# Patient Record
Sex: MALE | Race: OTHER | HISPANIC OR LATINO
[De-identification: names, ages, dates, MRNs, and addresses within clinical notes are randomized per-mention and may not be internally consistent; named-entity substitution may affect disease eponyms.]

---

## 2022-06-27 ENCOUNTER — APPOINTMENT (OUTPATIENT)
Dept: ORTHOPEDIC SURGERY | Facility: CLINIC | Age: 57
End: 2022-06-27
Payer: COMMERCIAL

## 2022-06-27 VITALS — HEIGHT: 70 IN | BODY MASS INDEX: 25.77 KG/M2 | WEIGHT: 180 LBS

## 2022-06-27 DIAGNOSIS — M54.12 RADICULOPATHY, CERVICAL REGION: ICD-10-CM

## 2022-06-27 DIAGNOSIS — Z78.9 OTHER SPECIFIED HEALTH STATUS: ICD-10-CM

## 2022-06-27 PROCEDURE — 73100 X-RAY EXAM OF WRIST: CPT | Mod: LT

## 2022-06-27 PROCEDURE — 99203 OFFICE O/P NEW LOW 30 MIN: CPT

## 2022-06-27 PROCEDURE — 72040 X-RAY EXAM NECK SPINE 2-3 VW: CPT

## 2022-06-27 NOTE — PHYSICAL EXAM
[Left] : left hand [NL (75)] : Dorsiflexion 75 degrees [NL (85)] : volarflexion 85 degrees [Right] : right hand [Volar Wrist] : volar wrist [5___] : grasp 5[unfilled]/5 [Disc space narrowing] : Disc space narrowing [Bilateral] : wrists bilaterally [There are no fractures, subluxations or dislocations. No significant abnormalities are seen] : There are no fractures, subluxations or dislocations. No significant abnormalities are seen [] : negative Phalen's [de-identified] : difficulty with adduction to the thumb

## 2022-06-27 NOTE — HISTORY OF PRESENT ILLNESS
[6] : 6 [Localized] : localized [Sharp] : sharp [Throbbing] : throbbing [Constant] : constant [Sleep] : sleep [Ice] : ice [Heat] : heat [de-identified] : 06/27/22: 55 yo RHD M with bilateral hand numbness and stiffness ongoing for years. The left side is worse. Pain shoots up to the elbow.  The numbess can wake him at night. He denies injury. He has been doing home exercises, ice and heat without relief. Hx hyperextension injury to the thumb 20 yrs ago, treated with PT. 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

## 2022-07-12 ENCOUNTER — APPOINTMENT (OUTPATIENT)
Dept: NEUROLOGY | Facility: CLINIC | Age: 57
End: 2022-07-12
Payer: COMMERCIAL

## 2022-07-12 DIAGNOSIS — R20.0 ANESTHESIA OF SKIN: ICD-10-CM

## 2022-07-12 DIAGNOSIS — R20.2 ANESTHESIA OF SKIN: ICD-10-CM

## 2022-07-12 DIAGNOSIS — M79.2 NEURALGIA AND NEURITIS, UNSPECIFIED: ICD-10-CM

## 2022-07-12 PROCEDURE — 95911 NRV CNDJ TEST 9-10 STUDIES: CPT

## 2022-07-12 PROCEDURE — 95886 MUSC TEST DONE W/N TEST COMP: CPT

## 2022-07-14 ENCOUNTER — APPOINTMENT (OUTPATIENT)
Dept: ORTHOPEDIC SURGERY | Facility: CLINIC | Age: 57
End: 2022-07-14
Payer: COMMERCIAL

## 2022-07-14 VITALS — WEIGHT: 180 LBS | HEIGHT: 70 IN | BODY MASS INDEX: 25.77 KG/M2

## 2022-07-14 PROCEDURE — 99213 OFFICE O/P EST LOW 20 MIN: CPT

## 2022-07-14 NOTE — DATA REVIEWED
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

## 2022-07-14 NOTE — PHYSICAL EXAM
[Disc space narrowing] : Disc space narrowing [Left] : left hand [NL (75)] : Dorsiflexion 75 degrees [NL (85)] : volarflexion 85 degrees [Right] : right hand [Volar Wrist] : volar wrist [5___] : grasp 5[unfilled]/5 [Bilateral] : wrists bilaterally [There are no fractures, subluxations or dislocations. No significant abnormalities are seen] : There are no fractures, subluxations or dislocations. No significant abnormalities are seen [] : negative Phalen's [de-identified] : difficulty with adduction to the thumb

## 2022-07-14 NOTE — HISTORY OF PRESENT ILLNESS
[6] : 6 [Localized] : localized [Sharp] : sharp [Throbbing] : throbbing [Constant] : constant [Sleep] : sleep [Ice] : ice [Heat] : heat [de-identified] : 06/27/22: 55 yo RHD M with bilateral hand numbness and stiffness ongoing for years. The left side is worse. Pain shoots up to the elbow.  The numbess can wake him at night. He denies injury. He has been doing home exercises, ice and heat without relief. Hx hyperextension injury to the thumb 20 yrs ago, treated with PT. 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

## 2022-07-14 NOTE — DISCUSSION/SUMMARY
[de-identified] : Discussed diagnosis with patient. Will have him see Dr. Pablo for surgical consultation

## 2022-07-22 ENCOUNTER — APPOINTMENT (OUTPATIENT)
Dept: ORTHOPEDIC SURGERY | Facility: CLINIC | Age: 57
End: 2022-07-22
Payer: COMMERCIAL

## 2022-07-22 VITALS — BODY MASS INDEX: 25.77 KG/M2 | HEIGHT: 70 IN | WEIGHT: 180 LBS

## 2022-07-22 DIAGNOSIS — M19.042 PRIMARY OSTEOARTHRITIS, RIGHT HAND: ICD-10-CM

## 2022-07-22 DIAGNOSIS — M19.041 PRIMARY OSTEOARTHRITIS, RIGHT HAND: ICD-10-CM

## 2022-07-22 DIAGNOSIS — G56.02 CARPAL TUNNEL SYNDROME, LEFT UPPER LIMB: ICD-10-CM

## 2022-07-22 DIAGNOSIS — G56.01 CARPAL TUNNEL SYNDROME, RIGHT UPPER LIMB: ICD-10-CM

## 2022-07-22 PROCEDURE — 99213 OFFICE O/P EST LOW 20 MIN: CPT

## 2022-07-22 NOTE — DISCUSSION/SUMMARY
[de-identified] : Nature of dx discussed\par He has not tried night splinting - recommend night splinting bilateral wrists \par Warm compress for hand stiffness\par Return 4 weeks for re-eval

## 2022-07-22 NOTE — PHYSICAL EXAM
[MP Joint] : MP joint [Bilateral] : wrists bilaterally [There are no fractures, subluxations or dislocations. No significant abnormalities are seen] : There are no fractures, subluxations or dislocations. No significant abnormalities are seen [] : no atrophy [de-identified] : R MF i> stiffness than other fingers [FreeTextEntry1] : MP degenerative changes

## 2022-07-22 NOTE — HISTORY OF PRESENT ILLNESS
[6] : 6 [Localized] : localized [Sharp] : sharp [Throbbing] : throbbing [Constant] : constant [Sleep] : sleep [Ice] : ice [Heat] : heat [Nothing helps with pain getting better] : Nothing helps with pain getting better [de-identified] : 56 year old RHD M ([employer] ) here for consult from Dr. Pimentel for bilateral hand numbness and stiffness ongoing for years. The left side is worse. Pain shoots up to the elbow.  The numbness can wake him at night. He denies injury. He has been doing home exercises, ice and heat without relief. Hx hyperextension injury to the thumb 20 yrs ago, treated with PT.  He has not splinted. [] : no [FreeTextEntry1] : olvin- arms [FreeTextEntry3] : 5 years [FreeTextEntry5] : Carpal tunnel in both hands. Pt is here for an evaluation  [FreeTextEntry6] : numbness

## 2022-08-19 ENCOUNTER — APPOINTMENT (OUTPATIENT)
Dept: ORTHOPEDIC SURGERY | Facility: CLINIC | Age: 57
End: 2022-08-19